# Patient Record
Sex: FEMALE | Race: WHITE
[De-identification: names, ages, dates, MRNs, and addresses within clinical notes are randomized per-mention and may not be internally consistent; named-entity substitution may affect disease eponyms.]

---

## 2017-05-30 ENCOUNTER — HOSPITAL ENCOUNTER (EMERGENCY)
Dept: HOSPITAL 74 - FER | Age: 72
Discharge: HOME | End: 2017-05-30
Payer: COMMERCIAL

## 2017-05-30 VITALS — SYSTOLIC BLOOD PRESSURE: 136 MMHG | DIASTOLIC BLOOD PRESSURE: 82 MMHG | HEART RATE: 65 BPM

## 2017-05-30 VITALS — TEMPERATURE: 97.8 F

## 2017-05-30 VITALS — BODY MASS INDEX: 32 KG/M2

## 2017-05-30 DIAGNOSIS — F41.9: Primary | ICD-10-CM

## 2017-05-30 DIAGNOSIS — T78.40XA: ICD-10-CM

## 2017-05-30 DIAGNOSIS — I10: ICD-10-CM

## 2017-05-30 LAB
ALBUMIN SERPL-MCNC: 4.5 G/DL (ref 3.5–5)
ALP SERPL-CCNC: 110 U/L (ref 32–92)
ALT SERPL-CCNC: 23 U/L (ref 10–40)
ANION GAP SERPL CALC-SCNC: 8 MMOL/L (ref 8–16)
AST SERPL-CCNC: 29 U/L (ref 10–42)
BASOPHILS # BLD: 0.9 % (ref 0–2)
BILIRUB SERPL-MCNC: 0.4 MG/DL (ref 0.2–1)
CALCIUM SERPL-MCNC: 10.1 MG/DL (ref 8.4–10.2)
CK MB: 7.1 NG/ML (ref 0.3–4)
CK SERPL-CCNC: 332 IU/L (ref 26–140)
CO2 SERPL-SCNC: 26 MMOL/L (ref 22–28)
CREAT SERPL-MCNC: 1.2 MG/DL (ref 0.6–1.3)
DEPRECATED RDW RBC AUTO: 13.7 % (ref 11.6–15.6)
EOSINOPHIL # BLD: 3.2 % (ref 0–4.5)
GLUCOSE SERPL-MCNC: 125 MG/DL (ref 74–106)
MCH RBC QN AUTO: 29 PG (ref 25.7–33.7)
MCHC RBC AUTO-ENTMCNC: 33.7 G/DL (ref 32–36)
MCV RBC: 86.1 FL (ref 80–96)
NEUTROPHILS # BLD: 41.4 % (ref 42.8–82.8)
PLATELET # BLD AUTO: 156 K/MM3 (ref 134–434)
PMV BLD: 10.6 FL (ref 7.5–11.1)
PROT SERPL-MCNC: 7.9 G/DL (ref 6.4–8.3)
TROPONIN I SERPL-MCNC: 0.04 NG/ML (ref 0.03–0.5)
WBC # BLD AUTO: 5.3 K/MM3 (ref 4–10.8)

## 2017-05-30 NOTE — PDOC
History of Present Illness





- General


Chief Complaint: Blood Pressure Problem


Stated Complaint: HIGH BLOOD PRESSURE


Time Seen by Provider: 05/30/17 22:06


History Source: Patient


Exam Limitations: No Limitations





- History of Present Illness


Initial Comments: 


05/30/17 22:26


This is a very anxious 71-year-old female who comes in with her family 

complaining of elevated blood pressure, feeling like she shaking and itching. 

Patient said that she noticed a rash in her arms and neck and it was itchy and 

she became very anxious about the rash and felt like she was shaky and took her 

blood pressure which was elevated so she came in for evaluation. As per her 

daughter who is doing the translation her mother takes her blood pressure 

multiple times a day. Her blood pressure normally runs once  systolic. 

Patient took an extra metoprolol just prior to coming in and her blood pressure 

was 188 systolic. Patient otherwise denied any shortness of breath, chest pain, 

headache, nausea, blurry vision or any other symptoms.





PAST MEDICAL HISTORY:  no significant history





PAST SURGICAL HISTORY:  no significant history





FAMILY HISTORY:  no pertinant history





SOCIAL HISTORY:  Pt lives with  family and is employed.





MEDICATIONS:  reviewed





ALLERGIES:  As per nursing notes





Review of Systems


General:  No fevers or chills, no weakness, no weight loss 


HEENT: No change in vision.  No sore throat,. No ear pain


CardioVascular:  No chest pain or shortness of breath


Respiratory:No cough, or wheezing. 


Gastrointestinal:  no nausea, vomitting, diarrhea or constipation,  No rectal 

bleeding


Genitourinary:  No dysuria, hematuria, or frequency


Musculoskeletal:  No joint or muscle pain or swelling


Neurologic: No headache, vertigo, dizziness or loss of consciousness


Psychiatric: nor depression 


Skin: No rashes or easy bruising


Endocrine: no increased thirst or abnormal weight change


Allergic: no skin or latex allergy


All other systems reviewed and normal








Exam:





General: Well-nourished well-developed individual, anxious


HEENT: Throat: Normal, tonsils normal, no erythema or exudate


               Neck: Supple, no meningeal signs, no lymphadenopathy


Eyes::Pupils equal reactive and round, extraocular motion intact


Chest: Nontender to palpation 


Cardiac: S1-S2 normal, regular rate and rhythm, no murmurs rubs or gallops


Respiratory: Lungs clear to auscultation bilateral


Abdomen: Soft, nondistended, normal bowel sounds, nontender to palpation 

diffusely


Extremities: Warm, dry, no cyanosis, clubbing, or edema


Skin there is some diffuse erythema and a few hives of the upper chest and back 

as well as arms bilateral.


Neuro: Alert and oriented x3, nonfocal exam, grossly intact, normal gait


Psych: Anxious mood and normal affect 








EKG normal sinus bradycardia at a rate of 58 no acute ST-T wave changes





Assessment and plan: This is a 71-year-old female with history of anxiety and 

hypertension who takes her blood pressure multiple times a day. Patient felt 

like her blood pressure was high when she developed some hives so took it and 

after taking several times it was actually high so she came in for evaluation. 

On arrival in the emergency room her blood pressure was 188 systolic. Patient 

was given something for anxiety and the hives and blood pressure improved. 

Patient had a workup that was otherwise unremarkable and she was to follow-up 

with her primary care doctor.




















Past History





- Past Medical History


Allergies/Adverse Reactions: 


 Allergies











Allergy/AdvReac Type Severity Reaction Status Date / Time


 


No Known Allergies Allergy   Verified 05/30/17 22:03











Home Medications: 


Ambulatory Orders





Amlodipine Besylate [Norvasc -] 5 mg PO DAILY 05/30/17 


Aspirin [Aspirin EC] 81 mg PO DAILY 05/30/17 


Atorvastatin Ca [Lipitor] 20 mg PO HS 05/30/17 


Metoprolol Tartrate [Lopressor -] 25 mg PO DAILY 05/30/17 








HTN: Yes


Hypercholesterolemia: Yes





- Psycho/Social/Smoking Cessation Hx


Anxiety: No


Suicidal Ideation: No


Smoking History: Never smoked


Have you smoked in the past 12 months: No


Hx Alcohol Use: No


Drug/Substance Use Hx: No


Substance Use Type: None





ED Treatment Course





- LABORATORY


CBC & Chemistry Diagram: 


 05/30/17 22:25





 05/30/17 22:25





*DC/Admit/Observation/Transfer


Diagnosis at time of Disposition: 


 Anxiety





Hypertension


Qualifiers:


 Hypertension type: essential hypertension Qualified Code(s): I10 - Essential (

primary) hypertension





Allergic reaction


Qualifiers:


 Encounter type: initial encounter Qualified Code(s): T78.40XA - Allergy, 

unspecified, initial encounter





- Discharge Dispostion


Disposition: HOME


Condition at time of disposition: Stable





- Referrals


Referrals: 


Edelmira Landa [Primary Care Provider] - 





- Patient Instructions


Printed Discharge Instructions:  How to Monitor Your Blood Pressure at Home


Additional Instructions: 


Do not take your blood pressure more than once or twice a week.





Return to the emergency department immediately with ANY new, persistent or 

worsening symptoms.





Continue any medications as previously prescribed by your physician.





You should follow up with your primary doctor as soon as possible regarding 

today's emergency department visit.


.


Please make sure your doctor reviews the results of your emergency evaluation.





Thank you for coming to the   Emergency Department today for your care. It was 

a pleasure to see you today. Please note that your evaluation is INCOMPLETE 

until you  follow-up with your doctor.

## 2017-05-31 NOTE — EKG
Test Reason : 

Blood Pressure : ***/*** mmHG

Vent. Rate : 058 BPM     Atrial Rate : 058 BPM

   P-R Int : 162 ms          QRS Dur : 068 ms

    QT Int : 436 ms       P-R-T Axes : 063 017 007 degrees

   QTc Int : 428 ms

 

SINUS BRADYCARDIA

CANNOT RULE OUT SEPTAL INFARCT , AGE UNDETERMINED

NONSPECIFIC T WAVE ABNORMALITY

ABNORMAL ECG

NO PREVIOUS ECGS AVAILABLE

Confirmed by AUSTYN BARON MD ([id number]) on 5/31/2017 11:50:54 AM

 

Referred By: MD FERRIS           Confirmed By:AUSTYN BARON MD

## 2017-10-06 ENCOUNTER — HOSPITAL ENCOUNTER (EMERGENCY)
Dept: HOSPITAL 74 - FER | Age: 72
Discharge: HOME | End: 2017-10-06
Payer: COMMERCIAL

## 2017-10-06 VITALS — HEART RATE: 80 BPM | TEMPERATURE: 98.4 F | DIASTOLIC BLOOD PRESSURE: 81 MMHG | SYSTOLIC BLOOD PRESSURE: 122 MMHG

## 2017-10-06 VITALS — BODY MASS INDEX: 31.8 KG/M2

## 2017-10-06 DIAGNOSIS — E78.5: ICD-10-CM

## 2017-10-06 DIAGNOSIS — S00.262A: Primary | ICD-10-CM
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
## 2017-10-06 DIAGNOSIS — I10: ICD-10-CM
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
## 2017-10-06 DIAGNOSIS — M79.605: ICD-10-CM
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
## 2017-10-06 LAB
ALBUMIN SERPL-MCNC: 5.1 G/DL (ref 3.5–5)
ALP SERPL-CCNC: 88 U/L (ref 32–92)
ALT SERPL-CCNC: 20 U/L (ref 10–40)
ANION GAP SERPL CALC-SCNC: 15 MMOL/L (ref 8–16)
AST SERPL-CCNC: 29 U/L (ref 10–42)
BASOPHILS # BLD: 0.2 % (ref 0–2)
BILIRUB SERPL-MCNC: 0.8 MG/DL (ref 0.2–1)
CALCIUM SERPL-MCNC: 10.3 MG/DL (ref 8.4–10.2)
CK SERPL-CCNC: 298 IU/L (ref 26–192)
CO2 SERPL-SCNC: 23 MMOL/L (ref 22–28)
CREAT SERPL-MCNC: 0.9 MG/DL (ref 0.6–1.3)
DEPRECATED RDW RBC AUTO: 13.1 % (ref 11.6–15.6)
EOSINOPHIL # BLD: 0.4 % (ref 0–4.5)
GLUCOSE SERPL-MCNC: 133 MG/DL (ref 74–106)
INR BLD: 1.13 (ref 0.82–1.09)
MCH RBC QN AUTO: 30.4 PG (ref 25.7–33.7)
MCHC RBC AUTO-ENTMCNC: 34.5 G/DL (ref 32–36)
MCV RBC: 88 FL (ref 80–96)
NEUTROPHILS # BLD: 78.3 % (ref 42.8–82.8)
PLATELET # BLD AUTO: 161 K/MM3 (ref 134–434)
PMV BLD: 11.3 FL (ref 7.5–11.1)
PROT SERPL-MCNC: 8 G/DL (ref 6.4–8.3)
PT PNL PPP: 12.6 SEC (ref 10.2–13)
TROPONIN I SERPL-MCNC: < 0.03 NG/ML (ref 0.03–0.5)
WBC # BLD AUTO: 6.8 K/MM3 (ref 4–10.8)

## 2017-10-06 PROCEDURE — 3E033GC INTRODUCTION OF OTHER THERAPEUTIC SUBSTANCE INTO PERIPHERAL VEIN, PERCUTANEOUS APPROACH: ICD-10-PCS | Performed by: EMERGENCY MEDICINE

## 2017-10-06 NOTE — PDOC
*Physical Exam





- Vital Signs


 Last Vital Signs











Temp Pulse Resp BP Pulse Ox


 


 98.4 F   80   16   122/81   97 


 


 10/06/17 19:24  10/06/17 19:24  10/06/17 19:24  10/06/17 19:24  10/06/17 19:24














ED Treatment Course





- LABORATORY


CBC & Chemistry Diagram: 


 10/06/17 15:00





 10/06/17 15:00





- ADDITIONAL ORDERS


Additional order review: 


 Laboratory  Results











  10/06/17 10/06/17





  15:00 15:00


 


PT with INR   12.6


 


INR   1.13


 


Sodium  133 L 


 


Potassium  4.5 


 


Chloride  95 L 


 


Carbon Dioxide  23 


 


Anion Gap  15 


 


BUN  22 H 


 


Creatinine  0.9  D 


 


Creat Clearance w eGFR  > 60 


 


Random Glucose  133 H 


 


Calcium  10.3 H 


 


Total Bilirubin  0.8  D 


 


AST  29 


 


ALT  20 


 


Alkaline Phosphatase  88 


 


Creatine Kinase  298 H 


 


Creatine Kinase Index  2.0 


 


CK-MB (CK-2)  6.2 H 


 


Troponin I  < 0.03 L 


 


Total Protein  8.0 


 


Albumin  5.1 H 


 


Lipase  33 








 











  10/06/17





  15:00


 


RBC  4.13


 


MCV  88.0


 


MCHC  34.5


 


RDW  13.1


 


MPV  11.3 H


 


Neutrophils %  78.3  D


 


Lymphocytes %  15.3  D


 


Monocytes %  5.8


 


Eosinophils %  0.4  D


 


Basophils %  0.2














- Medications


Given in the ED: 


ED Medications














Discontinued Medications














Generic Name Dose Route Start Last Admin





  Trade Name Freq  PRN Reason Stop Dose Admin


 


Diphenhydramine HCl  25 mg 10/06/17 15:15 10/06/17 15:50





  Benadryl Injection -  IVPB 10/06/17 15:16  25 mg





  ONCE ONE   Administration














Medical Decision Making





- Medical Decision Making





10/06/17 19:36


Patient was seen and evaluated by Dr. Leonard.  She has a hx of labile htn.  

She was out in the garden today and felt her left eye twitching and noted some 

swelling.  She became very nervous and her blood pressure went up.  Her left 

leg and left arm felt painful, and her pressure was high, so she came to the ED.





BP normalized here.  Left eye was noted with left upper lid with a red welt 

consistent with a bug bite.  Given benadryl.  Now feels well.  Repeat vital 

signs normal.





Labs reviewed by Dr. Leonard and by me, normal.





ECG with NSR, poor R wave progression, but no change from old tracing in our 

files.





CTAngiogram of the aorta was pending at the time of sign out, reviewed and 

normal.





Repeat exam, normal gait, normal neuro, left eye with slight swelling of the 

upper lid with small red welt, almost back to normal. Motor and sensory exam, 

romberg and pronator all normal.





Patient is on statin, asa and bp meds, which she will continue.  She will f/u 

with PMD next week.





Stable for discharge.





*DC/Admit/Observation/Transfer


Diagnosis at time of Disposition: 


 Essential (primary) hypertension, Pain of left lower extremity





Insect bite


Qualifiers:


 Encounter type: initial encounter Qualified Code(s): W57.XXXA - Bitten or 

stung by nonvenomous insect and other nonvenomous arthropods, initial encounter

; W57.XXXA - Bitten or stung by nonvenomous insect and other nonvenomous 

arthropods, initial encounter





- Discharge Dispostion


Disposition: HOME


Condition at time of disposition: Improved


Admit: No





- Referrals


Referrals: 


Edelmira Landa [Primary Care Provider] - 3 days





- Patient Instructions


Additional Instructions: 


You were evaluted for a bite to the left upper eyelid and for high blood 

pressure and pain in your left leg and left arm.  All of the tests came out 

normal, EKG no change from old EKG, CT angiogram normal, blood tests normal.  

Continue all of your home medications.  Follow up with your primary MD next 

week for repeat blood pressure check.  Return to the ER for any severe or 

progressive symptoms.





- Post Discharge Activity

## 2017-10-06 NOTE — PDOC
History of Present Illness





- History of Present Illness


Initial Comments: 


10/06/17 16:35


The patient is a 72 year old female, with a significant past medical history of 

HTN, who presents to the emergency department with chest tightness and left 

sided numbness for 3 hours (since 12 noon). According to patients family member

, patient was working in her daughters backyard when around noon, her left eye 

began to swell, her face became red, she felt tightness behind her head, chest, 

and back while resting in the yard. Her family member took the patients blood 

pressure and reports that it was 169/90. Patient says that she feels better now 

but the tightness is still there. She denies experiencing any unusual rashes 

recently. Her daughter states that she usually doesnt leave her mother 

unattended, especially in the garden after her mother was diagnosed with high 

blood pressure.





She denies recent fevers, chills, headache or dizziness. She denies recent 

nausea, vomit, diarrhea or constipation. She denies recent shortness of breath.





Allergies: NKA 


Past surgical history: None reported.


Primary Care Physician: Edelmira Xavier














<Elaina Grewal - Last Filed: 10/06/17 19:06>





<Viola Leonard - Last Filed: 10/07/17 07:21>





- General


Chief Complaint: Chest Pain


Stated Complaint: CHEST PAIN & LEFT LEG NUMBNESS


Time Seen by Provider: 10/06/17 14:36





Past History





<Elaina Grewal - Last Filed: 10/06/17 19:06>





- Past Medical History


HTN: Yes


Hypercholesterolemia: Yes





- Suicide/Smoking/Psychosocial Hx


Smoking History: Never smoked


Have you smoked in the past 12 months: No


Hx Alcohol Use: No


Drug/Substance Use Hx: No


Substance Use Type: None





<Viola Leonard - Last Filed: 10/07/17 07:21>





- Past Medical History


Allergies/Adverse Reactions: 


 Allergies











Allergy/AdvReac Type Severity Reaction Status Date / Time


 


No Known Allergies Allergy   Verified 05/30/17 22:03











Home Medications: 


Ambulatory Orders





Amlodipine Besylate [Norvasc -] 5 mg PO DAILY 05/30/17 


Aspirin [Aspirin EC] 81 mg PO DAILY 05/30/17 


Atorvastatin Ca [Lipitor] 20 mg PO HS 05/30/17 


Metoprolol Tartrate [Lopressor -] 25 mg PO DAILY 05/30/17 


Lisinopril 10 mg PO DAILY 10/06/17 











**Review of Systems





- Review of Systems


Able to Perform ROS?: Yes


Comments:: 


10/06/17 16:35


GENERAL/CONSTITUTIONAL: No fever or chills. No weakness.


HEAD, EYES, EARS, NOSE AND THROAT: +tightness behind head. No change in vision. 

No ear pain or discharge. No sore throat.


GASTROINTESTINAL: No nausea, vomiting, diarrhea or constipation.


GENITOURINARY: No dysuria, frequency, or change in urination.


CARDIOVASCULAR: +chest tightness. No shortness of breath.


RESPIRATORY: No cough, wheezing, or hemoptysis.


MUSCULOSKELETAL: +tightness in back. No joint or muscle swelling or pain. No 

neck pain. 


SKIN: + swollen and red left eyelid. No rash


NEUROLOGIC: +LLE numbness. No headache, vertigo, loss of consciousness. 


ENDOCRINE: No increased thirst. No abnormal weight change.


HEMATOLOGIC/LYMPHATIC: No anemia, easy bleeding, or history of blood clots.


ALLERGIC/IMMUNOLOGIC: No hives or skin allergy.





Is the patient limited English proficient: Yes





<Elaina Grewal - Last Filed: 10/06/17 19:06>





*Physical Exam





- Vital Signs


 Last Vital Signs











Temp Pulse Resp BP Pulse Ox


 


 97.6 F   110 H  16   141/85   99 


 


 10/06/17 14:33  10/06/17 14:33  10/06/17 14:33  10/06/17 14:33  10/06/17 14:33














- Physical Exam


Comments: 


10/06/17 16:37


GENERAL: Awake, alert, and fully oriented, in no acute distress


HEAD: No signs of trauma


EYES: +small erythematous region to left upper eyelid & some swelling. PERRLA, 

EOMI, sclera anicteric, conjunctiva clear


ENT: Auricles normal inspection, hearing grossly normal, nares patent, 

oropharynx clear without exudates. Moist mucosa


NECK: Normal ROM, supple, no lymphadenopathy, JVD, or masses


LUNGS: Breath sounds equal, clear to auscultation bilaterally.  No wheezes, and 

no crackles


HEART: Regular rate and rhythm, normal S1 and S2, no murmurs, rubs or gallops


ABDOMEN: Soft, nontender, normoactive bowel sounds.  No guarding, no rebound.  

No masses


EXTREMITIES: Normal range of motion, no edema.  No clubbing or cyanosis. No 

cords, erythema, or tenderness


NEUROLOGICAL: +decreased sensation on left side. strength 5/5 Cranial nerves II 

through XII grossly intact.  Normal speech, normal gait


SKIN: + redness & swelling to left eyelid. Warm, Dry, normal turgor,








<Elaina Grewal - Last Filed: 10/06/17 19:06>





ED Treatment Course





- LABORATORY


CBC & Chemistry Diagram: 


 10/06/17 15:00





 10/06/17 15:00





- ADDITIONAL ORDERS


Additional order review: 


 Laboratory  Results











  10/06/17 10/06/17





  15:00 15:00


 


PT with INR   12.6


 


INR   1.13


 


Sodium  133 L 


 


Potassium  4.5 


 


Chloride  95 L 


 


Carbon Dioxide  23 


 


Anion Gap  15 


 


BUN  22 H 


 


Creatinine  0.9  D 


 


Creat Clearance w eGFR  > 60 


 


Random Glucose  133 H 


 


Calcium  10.3 H 


 


Total Bilirubin  0.8  D 


 


AST  29 


 


ALT  20 


 


Alkaline Phosphatase  88 


 


Creatine Kinase  298 H 


 


Total Protein  8.0 


 


Albumin  5.1 H 


 


Lipase  33 








 











  10/06/17





  15:00


 


RBC  4.13


 


MCV  88.0


 


MCHC  34.5


 


RDW  13.1


 


MPV  11.3 H


 


Neutrophils %  78.3  D


 


Lymphocytes %  15.3  D


 


Monocytes %  5.8


 


Eosinophils %  0.4  D


 


Basophils %  0.2














- Medications


Given in the ED: 


ED Medications














Discontinued Medications














Generic Name Dose Route Start Last Admin





  Trade Name Freq  PRN Reason Stop Dose Admin


 


Diphenhydramine HCl  25 mg 10/06/17 15:15 10/06/17 15:50





  Benadryl Injection -  IVPB 10/06/17 15:16  25 mg





  ONCE ONE   Administration














<Elaina Grewal - Last Filed: 10/06/17 19:06>





- LABORATORY


CBC & Chemistry Diagram: 


 10/06/17 15:00





 10/06/17 15:00





<Viola Leonard - Last Filed: 10/07/17 07:21>





*DC/Admit/Observation/Transfer





- Attestations


Scribe Attestion: 





10/06/17 16:40





Documentation prepared by Elaina Grewal, acting as medical scribe for 

Viola Leonard MD.





<Elaina Grewal - Last Filed: 10/06/17 19:06>





<Viola Leonard - Last Filed: 10/07/17 07:21>


Diagnosis at time of Disposition: 


 Insect bite, Essential (primary) hypertension, Pain in left leg





- Discharge Dispostion


Disposition: HOME


Condition at time of disposition: Improved





- Referrals


Referrals: 


Edelmira Landa [Primary Care Provider] - 3 days





- Patient Instructions


Additional Instructions: 


You were evaluted for a bite to the left upper eyelid and for high blood 

pressure and pain in your left leg and left arm.  All of the tests came out 

normal, EKG no change from old EKG, CT angiogram normal, blood tests normal.  

Continue all of your home medications.  Follow up with your primary MD next 

week for repeat blood pressure check.  Return to the ER for any severe or 

progressive symptoms.

## 2017-10-07 NOTE — EKG
Test Reason : 

Blood Pressure : ***/*** mmHG

Vent. Rate : 099 BPM     Atrial Rate : 099 BPM

   P-R Int : 150 ms          QRS Dur : 078 ms

    QT Int : 350 ms       P-R-T Axes : 065 041 018 degrees

   QTc Int : 449 ms

 

NORMAL SINUS RHYTHM

BASELINE ARTIFACT

NONSPECIFIC ST AND T WAVE ABNORMALITY

SEPTAL INFARCT (CITED ON OR BEFORE 30-MAY-2017)

ABNORMAL ECG

WHEN COMPARED WITH ECG OF 30-MAY-2017 23:46,

VENT. RATE HAS INCREASED BY  41 BPM

REPEAT EKG IF CLINICALLY INDICATED

Confirmed by STEWART BRAGG MD ([id number]) on 10/7/2017 7:31:32 PM

 

Referred By:  SCOTTIE           Confirmed By:STEWART BRAGG MD

## 2018-10-26 ENCOUNTER — HOSPITAL ENCOUNTER (EMERGENCY)
Dept: HOSPITAL 74 - FER | Age: 73
Discharge: HOME | End: 2018-10-26
Payer: COMMERCIAL

## 2018-10-26 VITALS — DIASTOLIC BLOOD PRESSURE: 75 MMHG | TEMPERATURE: 98.1 F | HEART RATE: 101 BPM | SYSTOLIC BLOOD PRESSURE: 164 MMHG

## 2018-10-26 VITALS — BODY MASS INDEX: 34.7 KG/M2

## 2018-10-26 DIAGNOSIS — I10: ICD-10-CM

## 2018-10-26 DIAGNOSIS — E78.00: ICD-10-CM

## 2018-10-26 DIAGNOSIS — M79.605: Primary | ICD-10-CM

## 2018-10-26 NOTE — PDOC
History of Present Illness





- General


History Source: Patient


Exam Limitations: No Limitations





- History of Present Illness


Initial Comments: 





10/26/18 19:12


The patient is a 73 year old female with a significant PMH of HTN and HLD who 

presents to the emergency department with left lower leg pain today. Patient 

states she came from [place] in August and had left calf pain since for which 

she had an ultrasound done on 8/16 in the ER which showed no evidence of DVT. 

Patient reports sharp, left lower leg pain today, which made it difficult for 

her to ambulate. Patient notes she also heard a crackling sound on her left 

lower leg when she was trying to put her pants on today. Patient has seen her 

PCP for this pain and was told it may be a knee problem. Patient was prescribed 

ibuprofen for her pain with no relief. Daughter is at bedside and states the 

patient occasionally has muscle spasms at night, which wake her up from her 

sleep. Patient has no other associated symptoms. 





The patient denies chest pain, shortness of breath, headache and dizziness.


Denies fever, chills, nausea, vomit, diarrhea and constipation.


Denies dysuria, frequency, urgency and hematuria.





Allergies: NKA


Past surgical history: None reported. 


Social history: No reported alcohol, drug or cigarette use. 


 








<Naya Welch - Last Filed: 10/26/18 19:12>





<Robert Ibanez - Last Filed: 10/27/18 03:40>





- General


Chief Complaint: Pain


Stated Complaint: LEFT LEG PAIN   FROM CALF  TO BEHIND





Past History





<Naya Welch - Last Filed: 10/26/18 19:12>





- Past Medical History


COPD: No


HTN: Yes


Hypercholesterolemia: Yes





- Suicide/Smoking/Psychosocial Hx


Smoking History: Never smoked


Have you smoked in the past 12 months: No


Number of Cigarettes Smoked Daily: 0


Information on smoking cessation initiated: No


Hx Alcohol Use: Yes (SOCIAL)


Drug/Substance Use Hx: No


Substance Use Type: Alcohol





<Robert Ibanez - Last Filed: 10/27/18 03:40>





- Past Medical History


Allergies/Adverse Reactions: 


 Allergies











Allergy/AdvReac Type Severity Reaction Status Date / Time


 


No Known Allergies Allergy   Verified 10/26/18 18:43











Home Medications: 


Ambulatory Orders





Amlodipine Besylate [Norvasc -] 10 mg PO DAILY 05/30/17 


Aspirin [Aspirin EC] 81 mg PO DAILY 05/30/17 


Atorvastatin Ca [Lipitor] 40 mg PO HS 05/30/17 


Metoprolol Tartrate [Lopressor -] 50 mg PO DAILY 05/30/17 


Cyclobenzaprine HCl [Flexeril -] 5 mg PO BID PRN #6 tablet 10/26/18 


Ibuprofen [Motrin -] 800 mg PO DAILY 10/26/18 











**Review of Systems





- Review of Systems


Able to Perform ROS?: Yes


Comments:: 





10/26/18 19:14





ADULT ROS


GENERAL/CONSTITUTIONAL: No fever or chills. No weakness.


HEAD, EYES, EARS, NOSE AND THROAT: No change in vision. No ear pain or 

discharge. No sore throat.


CARDIOVASCULAR: No chest pain or shortness of breath.


RESPIRATORY: No cough, wheezing, or hemoptysis.


GASTROINTESTINAL: No nausea, vomiting, diarrhea or constipation.


GENITOURINARY: No dysuria, frequency, or change in urination.


MUSCULOSKELETAL: (+) Left lower extremity pain. No neck or back pain.


SKIN: No rash


NEUROLOGIC: No headache, vertigo, loss of consciousness, or change in strength/

sensation.


ENDOCRINE: No increased thirst. No abnormal weight change.


HEMATOLOGIC/LYMPHATIC: No anemia, easy bleeding, or history of blood clots.


ALLERGIC/IMMUNOLOGIC: No hives or skin allergy.





 





<Naya Welch - Last Filed: 10/26/18 19:12>





*Physical Exam





- Vital Signs


 Last Vital Signs











Temp Pulse Resp BP Pulse Ox


 


 98.1 F   101 H  20   164/75   100 


 


 10/26/18 18:43  10/26/18 18:43  10/26/18 18:43  10/26/18 18:43  10/26/18 18:43














- Physical Exam


Comments: 





10/26/18 19:14





ADULT EXAM


GENERAL: Awake, alert, and fully oriented, in no acute distress


HEAD: No signs of trauma


EYES: PERRLA, EOMI, sclera anicteric, conjunctiva clear


ENT: Auricles normal inspection, hearing grossly normal, nares patent, 

oropharynx clear without exudates. Moist mucosa


NECK: Normal ROM, supple, no lymphadenopathy, JVD, or masses


LUNGS: Breath sounds equal, clear to auscultation bilaterally.  No wheezes, and 

no crackles


HEART: Regular rate and rhythm, normal S1 and S2, no murmurs, rubs or gallops


ABDOMEN: Soft, nontender, normoactive bowel sounds.  No guarding, no rebound.  

No masses


EXTREMITIES: Normal range of motion, no edema.  No clubbing or cyanosis. No 

cords, erythema. (+) Mild left lower leg tenderness. 


NEUROLOGICAL: Cranial nerves II through XII grossly intact.  Normal speech, 

normal gait


SKIN: Warm, Dry, normal turgor, no rashes or lesions noted.








<aNya Welch - Last Filed: 10/26/18 19:12>





- Vital Signs


 Last Vital Signs











Temp Pulse Resp BP Pulse Ox


 


 98.1 F   101 H  20   164/75   100 


 


 10/26/18 18:43  10/26/18 18:43  10/26/18 18:43  10/26/18 18:43  10/26/18 18:43














<Robert Ibanez - Last Filed: 10/27/18 03:40>





Medical Decision Making





- Medical Decision Making





10/27/18 03:39


nonspecific leg pain


previously worked up for DVT, negative at the time


plain films today unremarkable, referred to radiology for definitive read





<Robert Ibanez - Last Filed: 10/27/18 03:40>





*DC/Admit/Observation/Transfer





- Attestations


Scribe Attestion: 





10/26/18 19:14





Documentation prepared by Naya Welch, acting as medical scribe for Robert Ibanez MD.





<Naya Welch - Last Filed: 10/26/18 19:12>





<Robert Ibanez - Last Filed: 10/27/18 03:40>


Diagnosis at time of Disposition: 


Leg pain


Qualifiers:


 Laterality: left Qualified Code(s): M79.605 - Pain in left leg








- Discharge Dispostion


Disposition: HOME


Condition at time of disposition: Stable





- Prescriptions


Prescriptions: 


Cyclobenzaprine HCl [Flexeril -] 5 mg PO BID PRN #6 tablet


 PRN Reason: Muscle Spasms





- Patient Instructions


Additional Instructions: 


Please followup with your doctor for continuing care

## 2021-08-15 ENCOUNTER — HOSPITAL ENCOUNTER (EMERGENCY)
Dept: HOSPITAL 74 - FER | Age: 76
LOS: 1 days | Discharge: HOME | End: 2021-08-16
Payer: COMMERCIAL

## 2021-08-15 VITALS — BODY MASS INDEX: 28.8 KG/M2

## 2021-08-15 DIAGNOSIS — R51.9: ICD-10-CM

## 2021-08-15 DIAGNOSIS — R10.9: ICD-10-CM

## 2021-08-15 DIAGNOSIS — N30.90: Primary | ICD-10-CM

## 2021-08-15 LAB
ALBUMIN SERPL-MCNC: 4.2 G/DL (ref 3.4–5)
ALP SERPL-CCNC: 71 U/L (ref 45–117)
ALT SERPL-CCNC: 11 U/L (ref 13–61)
ANION GAP SERPL CALC-SCNC: 9 MMOL/L (ref 8–16)
AST SERPL-CCNC: 23 U/L (ref 15–37)
BASOPHILS # BLD: 1.6 % (ref 0–2)
BILIRUB SERPL-MCNC: 0.7 MG/DL (ref 0.2–1)
BUN SERPL-MCNC: 23 MG/DL (ref 7–18)
CALCIUM SERPL-MCNC: 9.5 MG/DL (ref 8.5–10)
CHLORIDE SERPL-SCNC: 101 MMOL/L (ref 98–107)
CO2 SERPL-SCNC: 24 MMOL/L (ref 21–32)
CREAT SERPL-MCNC: 1 MG/DL (ref 0.55–1.3)
DEPRECATED RDW RBC AUTO: 13.4 % (ref 11.6–15.6)
EOSINOPHIL # BLD: 1.5 % (ref 0–4.5)
EPITH CASTS URNS QL MICRO: (no result) /HPF
GLUCOSE SERPL-MCNC: 132 MG/DL (ref 74–106)
HCT VFR BLD CALC: 32.9 % (ref 32.4–45.2)
HGB BLD-MCNC: 11.1 GM/DL (ref 10.7–15.3)
LYMPHOCYTES # BLD: 30.7 % (ref 8–40)
MCH RBC QN AUTO: 29.7 PG (ref 25.7–33.7)
MCHC RBC AUTO-ENTMCNC: 33.7 G/DL (ref 32–36)
MCV RBC: 88.1 FL (ref 80–96)
MONOCYTES # BLD AUTO: 9.6 % (ref 3.8–10.2)
NEUTROPHILS # BLD: 56.6 % (ref 42.8–82.8)
PLATELET # BLD AUTO: 117 10^3/UL (ref 134–434)
PMV BLD: 10.8 FL (ref 7.5–11.1)
PROT SERPL-MCNC: 7 G/DL (ref 6.4–8.2)
RBC # BLD AUTO: 3.74 M/MM3 (ref 3.6–5.2)
SODIUM SERPL-SCNC: 134 MMOL/L (ref 136–145)
WBC # BLD AUTO: 4.9 K/MM3 (ref 4–10.8)

## 2021-08-15 PROCEDURE — 3E0333Z INTRODUCTION OF ANTI-INFLAMMATORY INTO PERIPHERAL VEIN, PERCUTANEOUS APPROACH: ICD-10-PCS

## 2021-08-15 PROCEDURE — U0003 INFECTIOUS AGENT DETECTION BY NUCLEIC ACID (DNA OR RNA); SEVERE ACUTE RESPIRATORY SYNDROME CORONAVIRUS 2 (SARS-COV-2) (CORONAVIRUS DISEASE [COVID-19]), AMPLIFIED PROBE TECHNIQUE, MAKING USE OF HIGH THROUGHPUT TECHNOLOGIES AS DESCRIBED BY CMS-2020-01-R: HCPCS

## 2021-08-15 PROCEDURE — C9803 HOPD COVID-19 SPEC COLLECT: HCPCS

## 2021-08-15 PROCEDURE — 3E03329 INTRODUCTION OF OTHER ANTI-INFECTIVE INTO PERIPHERAL VEIN, PERCUTANEOUS APPROACH: ICD-10-PCS

## 2021-08-15 PROCEDURE — U0005 INFEC AGEN DETEC AMPLI PROBE: HCPCS

## 2021-08-16 VITALS — HEART RATE: 64 BPM | SYSTOLIC BLOOD PRESSURE: 154 MMHG | TEMPERATURE: 98.2 F | DIASTOLIC BLOOD PRESSURE: 82 MMHG
